# Patient Record
Sex: FEMALE | Race: BLACK OR AFRICAN AMERICAN | URBAN - METROPOLITAN AREA
[De-identification: names, ages, dates, MRNs, and addresses within clinical notes are randomized per-mention and may not be internally consistent; named-entity substitution may affect disease eponyms.]

---

## 2025-02-18 ENCOUNTER — OFFICE VISIT (OUTPATIENT)
Age: 45
End: 2025-02-18
Payer: OTHER GOVERNMENT

## 2025-02-18 VITALS
HEART RATE: 103 BPM | SYSTOLIC BLOOD PRESSURE: 136 MMHG | DIASTOLIC BLOOD PRESSURE: 91 MMHG | WEIGHT: 221 LBS | BODY MASS INDEX: 34.69 KG/M2 | HEIGHT: 67 IN

## 2025-02-18 DIAGNOSIS — E78.00 HIGH CHOLESTEROL: ICD-10-CM

## 2025-02-18 DIAGNOSIS — Z71.3 ENCOUNTER FOR DIETARY COUNSELING AND SURVEILLANCE: ICD-10-CM

## 2025-02-18 DIAGNOSIS — E11.9 CONTROLLED TYPE 2 DIABETES MELLITUS WITHOUT COMPLICATION, WITHOUT LONG-TERM CURRENT USE OF INSULIN (HCC): ICD-10-CM

## 2025-02-18 DIAGNOSIS — E66.811 CLASS 1 OBESITY WITH SERIOUS COMORBIDITY AND BODY MASS INDEX (BMI) OF 34.0 TO 34.9 IN ADULT, UNSPECIFIED OBESITY TYPE: Primary | ICD-10-CM

## 2025-02-18 DIAGNOSIS — Z71.3 ENCOUNTER FOR WEIGHT LOSS COUNSELING: ICD-10-CM

## 2025-02-18 DIAGNOSIS — I10 PRIMARY HYPERTENSION: ICD-10-CM

## 2025-02-18 PROCEDURE — 3080F DIAST BP >= 90 MM HG: CPT

## 2025-02-18 PROCEDURE — 99214 OFFICE O/P EST MOD 30 MIN: CPT

## 2025-02-18 PROCEDURE — 3075F SYST BP GE 130 - 139MM HG: CPT

## 2025-02-18 RX ORDER — PHENTERMINE AND TOPIRAMATE 7.5; 46 MG/1; MG/1
1 CAPSULE, EXTENDED RELEASE ORAL DAILY
Qty: 30 CAPSULE | Refills: 1 | Status: SHIPPED | OUTPATIENT
Start: 2025-02-18 | End: 2025-03-20

## 2025-02-18 RX ORDER — ATORVASTATIN CALCIUM 10 MG/1
TABLET, FILM COATED ORAL
COMMUNITY
Start: 2024-04-26 | End: 2025-04-24

## 2025-02-18 RX ORDER — AMLODIPINE BESYLATE 2.5 MG/1
TABLET ORAL
COMMUNITY
Start: 2025-01-24

## 2025-02-18 RX ORDER — PHENTERMINE AND TOPIRAMATE 3.75; 23 MG/1; MG/1
1 CAPSULE, EXTENDED RELEASE ORAL DAILY
Qty: 14 CAPSULE | Refills: 0 | Status: SHIPPED | OUTPATIENT
Start: 2025-02-18 | End: 2025-03-04

## 2025-02-18 RX ORDER — CHLORTHALIDONE 25 MG/1
TABLET ORAL
COMMUNITY
Start: 2024-04-26 | End: 2025-04-24

## 2025-02-18 RX ORDER — PAROXETINE 30 MG/1
TABLET, FILM COATED ORAL
COMMUNITY

## 2025-02-18 RX ORDER — ALBUTEROL SULFATE 90 UG/1
INHALANT RESPIRATORY (INHALATION)
COMMUNITY
Start: 2025-01-10

## 2025-02-18 ASSESSMENT — ENCOUNTER SYMPTOMS
NAUSEA: 0
ABDOMINAL PAIN: 0
COUGH: 0
VOMITING: 0
DIARRHEA: 0
CONSTIPATION: 0
SHORTNESS OF BREATH: 0

## 2025-02-18 NOTE — PROGRESS NOTES
Chief Complaint   Patient presents with    New Patient        Medical Weight Loss Progress Note: Initial Evaluation    Ledy Upton is a 44 y.o. female whose Body mass index is 34.61 kg/m². She is here for her Initial Evaluation for medical bariatric care. Mrs. Upton was recently diagnosed with DM II (Hgb A1C 6.9) and she also has a medical history of hypertension and high cholesterol. She shares that she wants to live past 71 years old. Both her parents  at that age.  She states that eating pasting is the problem. She attends the gym twice a week for 2 hours. She is interested in medication management and meal replacements.    Assessment & Plan     Based on her history and exam, Ledy Upton is a good candidate for the [facility] Program.She will benefit greatly from the nutritional content of the program. She was once prescribed Wegovy but it was denied by her insurance. Qsymia discussed to aid in her weight loss journey. Patient agrees. Side effects and titration schedule discussed with patient. Medication education also provided. Plan of care will include:    -Start Qsymia (phentermine/topiramate) 3.75/23 mg dose once daily for 14 days, then increase to Qsymia 7.5/46 mg dose daily  -Start a high protein/low carb bariatric diet, <1500 kcal daily, focusing on smaller portion sizes and limiting snacking   -Exercise two to three times a week for 30 minutes, increase as tolerated  -Avoid fast food and sweetened drinks  -Maintain a fluid intake of 64 ounces or more daily    Phentermine (Adipex-P): contraindicated with her hypertension today's reading is 136/91 with a heart rate of 103.  Diethylpropion: see phentermine  Phentermine-topiramate (Qysmia): preferred, with low dose phentermine  Bupropion-naltrexone (Contrave): contraindicated due to HTN or currently taking bupropion or not indicated for specific eating behaviors    1. Class 1 obesity with serious comorbidity and body mass index

## 2025-02-19 ENCOUNTER — CLINICAL DOCUMENTATION (OUTPATIENT)
Age: 45
End: 2025-02-19

## 2025-02-19 NOTE — PROGRESS NOTES
Enrollment in [facility] program is CONFIRMED.   - Program fees APPLY.  - Initial Enrollment Program fee: PAYMENT RECEIVED  - Annual Renewal Fee applies every February    Upon enrollment patient has access to:  - Followup Visits with L Provider.  - My Healthy Journey Pop account.  - Balance Smart Body Comp Scale.  - In Person Nutrition Education Classes.  - [facility] Prescription Prior Authorization submissions, if applicable.  - Access to Meal Replacements, if applicable.

## 2025-02-25 ENCOUNTER — CLINICAL DOCUMENTATION (OUTPATIENT)
Age: 45
End: 2025-02-25

## 2025-02-25 NOTE — PROGRESS NOTES
Prior Authorization for  Qsymia 3.75 was submitted via [facility].     [facility] KEY/CASE ID: [id number]  STATUS: Approved, authorized through 2/25/2026  PATIENT NOTIFIED via [employer]      CaseId:[id number];Status:Approved;Review Type:Prior Auth;Coverage Start Date:01/26/2025;Coverage End Date:02/25/2026;

## 2025-02-27 DIAGNOSIS — E66.811 CLASS 1 OBESITY WITH SERIOUS COMORBIDITY AND BODY MASS INDEX (BMI) OF 34.0 TO 34.9 IN ADULT, UNSPECIFIED OBESITY TYPE: ICD-10-CM

## 2025-02-27 RX ORDER — PHENTERMINE AND TOPIRAMATE 3.75; 23 MG/1; MG/1
1 CAPSULE, EXTENDED RELEASE ORAL DAILY
Qty: 14 CAPSULE | Refills: 0 | Status: SHIPPED | OUTPATIENT
Start: 2025-02-27 | End: 2025-03-13

## 2025-02-27 RX ORDER — PHENTERMINE AND TOPIRAMATE 7.5; 46 MG/1; MG/1
1 CAPSULE, EXTENDED RELEASE ORAL DAILY
Qty: 30 CAPSULE | Refills: 1 | Status: SHIPPED | OUTPATIENT
Start: 2025-02-27 | End: 2025-03-29

## 2025-03-18 ENCOUNTER — OFFICE VISIT (OUTPATIENT)
Age: 45
End: 2025-03-18
Payer: OTHER GOVERNMENT

## 2025-03-18 VITALS
DIASTOLIC BLOOD PRESSURE: 87 MMHG | HEIGHT: 67 IN | SYSTOLIC BLOOD PRESSURE: 123 MMHG | HEART RATE: 92 BPM | TEMPERATURE: 97.4 F | BODY MASS INDEX: 33.12 KG/M2 | WEIGHT: 211 LBS | OXYGEN SATURATION: 98 %

## 2025-03-18 DIAGNOSIS — E66.811 CLASS 1 OBESITY WITH SERIOUS COMORBIDITY AND BODY MASS INDEX (BMI) OF 34.0 TO 34.9 IN ADULT, UNSPECIFIED OBESITY TYPE: Primary | ICD-10-CM

## 2025-03-18 DIAGNOSIS — Z71.3 ENCOUNTER FOR WEIGHT LOSS COUNSELING: ICD-10-CM

## 2025-03-18 DIAGNOSIS — I10 PRIMARY HYPERTENSION: ICD-10-CM

## 2025-03-18 DIAGNOSIS — Z79.899 FOLLOW-UP ENCOUNTER INVOLVING MEDICATION: ICD-10-CM

## 2025-03-18 DIAGNOSIS — Z71.3 ENCOUNTER FOR DIETARY COUNSELING AND SURVEILLANCE: ICD-10-CM

## 2025-03-18 PROCEDURE — 99213 OFFICE O/P EST LOW 20 MIN: CPT

## 2025-03-18 PROCEDURE — 3079F DIAST BP 80-89 MM HG: CPT

## 2025-03-18 PROCEDURE — 3074F SYST BP LT 130 MM HG: CPT

## 2025-03-18 NOTE — PROGRESS NOTES
Chief Complaint   Patient presents with    Follow-up     [facility] Nurse Note:       CC: Weight Management    Ledy Upton is a 44 y.o. female who is here for her f/up medical provider visit for the Medication program.       Did you have any problems adhering to the program since last visit? No  If yes, please explain:     Since your last visit, have you experienced any complications? No    Have you received any other medical care since last visit? No  If yes, where and for what?     Have you had any change in your medications since your last visit? No If yes what?     Are you taking an anti-obesity medication? Yes If yes what and are you experiencing any side effects?      Would you still like to continue your current medication and dosage? Yes    BP Readings from Last 3 Encounters:   03/18/25 123/87   02/18/25 (!) 136/91        Eating Habits Over Last Week:    How many oz of sugar-free fluids are you consuming? 64    Did you consume your prescribed meal replacement regimen each day? No    Physical Activity Routine:    Aerobic exercise: walking and gym    Resistance exercise: walking and gym  
logging daily consumption.  Exercise Plan: 150 minutes exercise weekly. Patient instructed to access My Healthy Journey to log daily exercise.    I have reviewed/discussed the above normal BMI with the patient.  I have recommended the following interventions: dietary management education, guidance, and counseling, encourage exercise, monitor weight, and prescribed dietary intake .       The primary encounter diagnosis was Class 1 obesity with serious comorbidity and body mass index (BMI) of 34.0 to 34.9 in adult, unspecified obesity type. Diagnoses of Encounter for weight loss counseling, Encounter for dietary counseling and surveillance, Primary hypertension, and Follow-up encounter involving medication were also pertinent to this visit.      Return in about 4 weeks (around 4/15/2025) for medication follow-up, weight management.       SHIMON SpiveyP

## 2025-03-19 ASSESSMENT — ENCOUNTER SYMPTOMS
SHORTNESS OF BREATH: 0
VOMITING: 0
ABDOMINAL PAIN: 0
DIARRHEA: 0
CONSTIPATION: 0
NAUSEA: 0
COUGH: 0

## 2025-04-18 ENCOUNTER — OFFICE VISIT (OUTPATIENT)
Age: 45
End: 2025-04-18
Payer: OTHER GOVERNMENT

## 2025-04-18 VITALS
OXYGEN SATURATION: 98 % | HEIGHT: 68 IN | SYSTOLIC BLOOD PRESSURE: 112 MMHG | BODY MASS INDEX: 30.28 KG/M2 | DIASTOLIC BLOOD PRESSURE: 70 MMHG | TEMPERATURE: 97 F | RESPIRATION RATE: 18 BRPM | HEART RATE: 80 BPM | WEIGHT: 199.8 LBS

## 2025-04-18 DIAGNOSIS — R53.83 FATIGUE, UNSPECIFIED TYPE: ICD-10-CM

## 2025-04-18 DIAGNOSIS — E66.811 CLASS 1 OBESITY WITH SERIOUS COMORBIDITY AND BODY MASS INDEX (BMI) OF 30.0 TO 30.9 IN ADULT, UNSPECIFIED OBESITY TYPE: Primary | ICD-10-CM

## 2025-04-18 PROCEDURE — 99213 OFFICE O/P EST LOW 20 MIN: CPT

## 2025-04-18 RX ORDER — LEVONORGESTREL AND ETHINYL ESTRADIOL 0.15-0.03
1 KIT ORAL DAILY
COMMUNITY

## 2025-04-18 RX ORDER — PHENTERMINE AND TOPIRAMATE 11.25; 69 MG/1; MG/1
1 CAPSULE, EXTENDED RELEASE ORAL DAILY
Qty: 30 CAPSULE | Refills: 0 | OUTPATIENT
Start: 2025-04-18 | End: 2025-05-18

## 2025-04-18 NOTE — PROGRESS NOTES
Chief Complaint   Patient presents with    Weight Management    1. Have you been to the ER, urgent care clinic since your last visit?  Hospitalized since your last visit?No    2. Have you seen or consulted any other health care providers outside of the [facility] since your last visit?  Include any pap smears or colon screening. No   [facility] Nurse Note:       CC: Weight Management    Ledy Upton is a 44 y.o. female who is here for her f/up medical provider visit for the Medication program.       Did you have any problems adhering to the program since last visit? Yes  If yes, please explain: cravings    Since your last visit, have you experienced any complications? No    Have you received any other medical care since last visit? No  If yes, where and for what?     Have you had any change in your medications since your last visit? Yes If yes what? Pharmacy out of [facility] times four days also paxil increased by pcp    Are you taking an anti-obesity medication? Yes If yes what and are you experiencing any side effects?      Would you still like to continue your current medication and dosage? Yes    BP Readings from Last 3 Encounters:   03/18/25 123/87   02/18/25 (!) 136/91        Eating Habits Over Last Week:    How many oz of sugar-free fluids are you consuming? 64 plus    Did you consume your prescribed meal replacement regimen each day? Yes, but having  cravings     Physical Activity Routine:    Aerobic exercise: trying to walk but not as much due to pollen    Resistance exercise: 0

## 2025-04-23 ASSESSMENT — ENCOUNTER SYMPTOMS
VOMITING: 0
ABDOMINAL PAIN: 0
CONSTIPATION: 0
NAUSEA: 0
SHORTNESS OF BREATH: 0
COUGH: 0
DIARRHEA: 0

## 2025-04-23 NOTE — PROGRESS NOTES
[facility] Progress Note:   F/up Provider Visit    CC: Weight Management    Ledy Upton is a 44 y.o. female who is here for her f/up medical provider visit for the Medication and LCD program. She is prescribed Qsymia.    Patient states that she satisfied with the medication and continues to follow the LCD. However, she does not see a lot of appetite suppression as she did in the beginning.She has found herself exhausted lately. 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 She has not experienced moods swings, agitation, or emotional crying.     See nurse notes for additional subjective information.        4/18/2025    11:43 AM 3/18/2025    11:33 AM 2/18/2025    11:35 AM   Non-Surgical Weight Loss Tracker   Consult Date 2/18/2025 2/18/2025   Initial Height 5' 7\" 5' 7\" 5' 7\"   Initial Weight 221 lbs 221 lbs 221 lbs   Ideal Body Weight  140 lb 140 lb   Initial BMI 34.6 34.61 34.61   Initial EBW  81 lb 81 lb   Date 4/18/2025 3/18/2025    Weight 199 lb 12.8 oz 211 lb    BMI 31.3 33.04    Weight Change since last Visit -11 lb 3.2 oz -10 lb    Weight Change since Initial Consult -21 lb 3.2 oz -10 lb    % EBWL 26% 12%    % Total Body Weight Loss  10%          Arm: 13  Waist: 36  Thigh: 23.25  Body Fat Percentage: 36.1%    Body composition scanned to media.    Down 12 lbs and 5.25 inches since last visit. Down 22 lbs since starting program on 2/18/25.    Goal wt: 199 lbs  EKG due: 171 lbs  Ideal body weight: 63.9 kg (140 lb 14 oz)  Adjusted ideal body weight: 74.6 kg (164 lb 7.1 oz)  Body mass index is 30.38 kg/m².    History    Past Medical History:   Diagnosis Date    Diabetes mellitus (HCC)     Hyperlipidemia     Hypertension      No past surgical history on file.    Current Outpatient Medications   Medication Sig Dispense Refill    Phentermine-Topiramate (QSYMIA PO) Take by mouth

## 2025-04-25 ENCOUNTER — HOSPITAL ENCOUNTER (OUTPATIENT)
Facility: HOSPITAL | Age: 45
Discharge: HOME | End: 2025-04-28
Payer: OTHER GOVERNMENT

## 2025-04-25 DIAGNOSIS — R53.83 FATIGUE, UNSPECIFIED TYPE: ICD-10-CM

## 2025-04-25 LAB
FOLATE SERPL-MCNC: >20 NG/ML (ref 3.1–17.5)
VIT B12 SERPL-MCNC: 609 PG/ML (ref 211–911)

## 2025-04-25 PROCEDURE — 82746 ASSAY OF FOLIC ACID SERUM: CPT

## 2025-04-25 PROCEDURE — 82607 VITAMIN B-12: CPT

## 2025-05-13 DIAGNOSIS — E66.811 CLASS 1 OBESITY WITH SERIOUS COMORBIDITY AND BODY MASS INDEX (BMI) OF 30.0 TO 30.9 IN ADULT, UNSPECIFIED OBESITY TYPE: ICD-10-CM

## 2025-05-13 RX ORDER — PHENTERMINE AND TOPIRAMATE 11.25; 69 MG/1; MG/1
1 CAPSULE, EXTENDED RELEASE ORAL DAILY
Qty: 30 CAPSULE | Refills: 0 | Status: SHIPPED | OUTPATIENT
Start: 2025-05-13 | End: 2025-06-12

## 2025-06-13 ENCOUNTER — OFFICE VISIT (OUTPATIENT)
Age: 45
End: 2025-06-13
Payer: OTHER GOVERNMENT

## 2025-06-13 VITALS
SYSTOLIC BLOOD PRESSURE: 114 MMHG | HEIGHT: 67 IN | HEART RATE: 74 BPM | BODY MASS INDEX: 28.9 KG/M2 | DIASTOLIC BLOOD PRESSURE: 81 MMHG | OXYGEN SATURATION: 100 % | RESPIRATION RATE: 18 BRPM | WEIGHT: 184.1 LBS

## 2025-06-13 DIAGNOSIS — Z86.39 HISTORY OF OBESITY: ICD-10-CM

## 2025-06-13 DIAGNOSIS — Z71.3 ENCOUNTER FOR WEIGHT LOSS COUNSELING: ICD-10-CM

## 2025-06-13 DIAGNOSIS — Z71.3 ENCOUNTER FOR DIETARY COUNSELING AND SURVEILLANCE: ICD-10-CM

## 2025-06-13 DIAGNOSIS — Z79.899 FOLLOW-UP ENCOUNTER INVOLVING MEDICATION: ICD-10-CM

## 2025-06-13 DIAGNOSIS — E66.3 OVERWEIGHT WITH BODY MASS INDEX (BMI) OF 28 TO 28.9 IN ADULT: Primary | ICD-10-CM

## 2025-06-13 PROCEDURE — 99213 OFFICE O/P EST LOW 20 MIN: CPT

## 2025-06-13 NOTE — PROGRESS NOTES
Chief Complaint   Patient presents with    Follow-up     Medical weight loss     1. Have you been to the ER, urgent care clinic since your last visit?  Hospitalized since your last visit?No    2. Have you seen or consulted any other health care providers outside of the [facility] since your last visit?  Include any pap smears or colon screening. No    [facility] Nurse Note:       CC: Weight Management    Ledy Upton is a 45 y.o. female who is here for her f/up medical provider visit for the Medication program.       Did you have any problems adhering to the program since last visit? No  If yes, please explain:     Since your last visit, have you experienced any complications? No    Have you received any other medical care since last visit? No  If yes, where and for what?     Have you had any change in your medications since your last visit? No If yes what?     Are you taking an anti-obesity medication? Yes If yes what and are you experiencing any side effects?      Would you still like to continue your current medication and dosage? Yes    BP Readings from Last 3 Encounters:   04/18/25 112/70   03/18/25 123/87   02/18/25 (!) 136/91        Eating Habits Over Last Week:    How many oz of sugar-free fluids are you consuming?     Did you consume your prescribed meal replacement regimen each day? Yes    Physical Activity Routine:    Aerobic exercise: 2-3x week, 30-45min    Resistance exercise: 2-3x week, 30-45min

## 2025-06-16 RX ORDER — PHENTERMINE AND TOPIRAMATE 11.25; 69 MG/1; MG/1
1 CAPSULE, EXTENDED RELEASE ORAL DAILY
Qty: 30 CAPSULE | Refills: 3 | Status: SHIPPED | OUTPATIENT
Start: 2025-06-16 | End: 2025-07-16

## 2025-06-20 ASSESSMENT — ENCOUNTER SYMPTOMS
NAUSEA: 0
DIARRHEA: 0
CONSTIPATION: 0
ABDOMINAL PAIN: 0
VOMITING: 0
SHORTNESS OF BREATH: 0
COUGH: 0

## 2025-06-20 NOTE — PROGRESS NOTES
[facility] Progress Note:   F/up Provider Visit    CC: Weight Management    Ledy Upton is a 45 y.o. female who is here for her f/up medical provider visit for the Medication and LCD program. She is prescribed Qsymia.    Patient states that overall she is doing well. However, now that she has met her goal weight she is unsure of what to do as far as if she should continue or start eating regular foods.     See nurse notes for additional subjective information.        6/13/2025    11:54 AM 4/18/2025    11:43 AM 3/18/2025    11:33 AM 2/18/2025    11:35 AM   Non-Surgical Weight Loss Tracker   Consult Date  2/18/2025 2/18/2025   Initial Height  5' 7\" 5' 7\" 5' 7\"   Initial Weight  221 lbs 221 lbs 221 lbs   Ideal Body Weight   140 lb 140 lb   Initial BMI  34.6 34.61 34.61   Initial EBW   81 lb 81 lb   Date 6/13/2025 4/18/2025 3/18/2025    Weight 184 lb 1.6 oz 199 lb 12.8 oz 211 lb    BMI  31.3 33.04    Weight Change since last Visit -15 lb 11.2 oz -11 lb 3.2 oz -10 lb    Weight Change since Initial Consult -36 lb 14.4 oz -21 lb 3.2 oz -10 lb    % EBWL 46% 26% 12%    % Total Body Weight Loss   10%     Non-Surgical Subsequent Eval Fat Mass  62 lb      Non-Surgical Subsequent Eval % Body Fat  34%      Non-Surgical Subsequent Eval Fat Free Mass  120 lb      Non-Surgical Subsequent Eval Water Weight  86 lb 6.4 oz      Non-Surgical Subsequent Eval Muscle Mass  33 lb 9.6 oz           Arm: 12  Waist: 33  Thigh: 23  Body Fat Percentage: 34%    Body composition scanned to media.    Down 15 lbs and 4.25 inches since last visit. Down 37 lbs since starting program on 2/18/25.    Goal wt: 160-170 lbs  EKG due: 171 lbs  Ideal body weight: 61.6 kg (135 lb 12.9 oz)  Adjusted ideal body weight: 70.4 kg (155 lb 1.9 oz)  Body mass index is 28.83 kg/m².    History    Past Medical History:   Diagnosis Date    Diabetes mellitus (HCC)     Hyperlipidemia     Hypertension      No past surgical history on

## 2025-06-30 DIAGNOSIS — E66.3 OVERWEIGHT WITH BODY MASS INDEX (BMI) OF 28 TO 28.9 IN ADULT: Primary | ICD-10-CM

## 2025-06-30 RX ORDER — PHENTERMINE AND TOPIRAMATE 11.25; 69 MG/1; MG/1
1 CAPSULE, EXTENDED RELEASE ORAL DAILY
Qty: 30 CAPSULE | Refills: 3 | Status: SHIPPED | OUTPATIENT
Start: 2025-06-30 | End: 2025-07-30

## 2025-07-08 ENCOUNTER — HOSPITAL ENCOUNTER (OUTPATIENT)
Facility: HOSPITAL | Age: 45
Discharge: HOME | End: 2025-07-11

## 2025-08-05 ENCOUNTER — OFFICE VISIT (OUTPATIENT)
Age: 45
End: 2025-08-05
Payer: OTHER GOVERNMENT

## 2025-08-05 VITALS
HEART RATE: 67 BPM | WEIGHT: 178 LBS | DIASTOLIC BLOOD PRESSURE: 75 MMHG | HEIGHT: 67 IN | BODY MASS INDEX: 27.94 KG/M2 | SYSTOLIC BLOOD PRESSURE: 114 MMHG | RESPIRATION RATE: 18 BRPM

## 2025-08-05 DIAGNOSIS — Z71.3 ENCOUNTER FOR DIETARY COUNSELING AND SURVEILLANCE: ICD-10-CM

## 2025-08-05 DIAGNOSIS — Z79.899 FOLLOW-UP ENCOUNTER INVOLVING MEDICATION: ICD-10-CM

## 2025-08-05 DIAGNOSIS — I10 PRIMARY HYPERTENSION: ICD-10-CM

## 2025-08-05 DIAGNOSIS — K59.09 OTHER CONSTIPATION: ICD-10-CM

## 2025-08-05 DIAGNOSIS — Z86.39 HISTORY OF OBESITY: ICD-10-CM

## 2025-08-05 DIAGNOSIS — Z71.3 ENCOUNTER FOR WEIGHT LOSS COUNSELING: ICD-10-CM

## 2025-08-05 DIAGNOSIS — E66.3 OVERWEIGHT WITH BODY MASS INDEX (BMI) OF 28 TO 28.9 IN ADULT: Primary | ICD-10-CM

## 2025-08-05 DIAGNOSIS — E78.00 HIGH CHOLESTEROL: ICD-10-CM

## 2025-08-05 PROCEDURE — 3078F DIAST BP <80 MM HG: CPT

## 2025-08-05 PROCEDURE — 99213 OFFICE O/P EST LOW 20 MIN: CPT

## 2025-08-05 PROCEDURE — 3074F SYST BP LT 130 MM HG: CPT

## 2025-08-05 RX ORDER — POLYETHYLENE GLYCOL 3350 17 G/17G
17 POWDER, FOR SOLUTION ORAL DAILY PRN
Qty: 510 G | Refills: 1 | Status: SHIPPED | OUTPATIENT
Start: 2025-08-05 | End: 2025-08-08

## 2025-08-05 RX ORDER — AMOXICILLIN 250 MG
2 CAPSULE ORAL
Qty: 60 TABLET | Refills: 1 | Status: SHIPPED | OUTPATIENT
Start: 2025-08-05 | End: 2025-08-08

## 2025-08-05 ASSESSMENT — ENCOUNTER SYMPTOMS
ABDOMINAL PAIN: 0
DIARRHEA: 0
VOMITING: 0
COUGH: 0
SHORTNESS OF BREATH: 0
CONSTIPATION: 0
NAUSEA: 0

## 2025-08-08 DIAGNOSIS — K59.09 OTHER CONSTIPATION: ICD-10-CM

## 2025-08-08 RX ORDER — POLYETHYLENE GLYCOL 3350 17 G/17G
17 POWDER, FOR SOLUTION ORAL DAILY PRN
Qty: 510 G | Refills: 5 | Status: SHIPPED | OUTPATIENT
Start: 2025-08-08 | End: 2026-02-04

## 2025-08-08 RX ORDER — AMOXICILLIN 250 MG
2 CAPSULE ORAL
Qty: 60 TABLET | Refills: 5 | Status: SHIPPED | OUTPATIENT
Start: 2025-08-08

## 2025-08-12 ENCOUNTER — CLINICAL DOCUMENTATION (OUTPATIENT)
Facility: HOSPITAL | Age: 45
End: 2025-08-12